# Patient Record
Sex: MALE | Race: WHITE | NOT HISPANIC OR LATINO | URBAN - NONMETROPOLITAN AREA
[De-identification: names, ages, dates, MRNs, and addresses within clinical notes are randomized per-mention and may not be internally consistent; named-entity substitution may affect disease eponyms.]

---

## 2024-01-17 ENCOUNTER — TELEPHONE (OUTPATIENT)
Dept: PRIMARY CARE | Facility: CLINIC | Age: 34
End: 2024-01-17

## 2024-01-17 DIAGNOSIS — H10.33 ACUTE CONJUNCTIVITIS OF BOTH EYES, UNSPECIFIED ACUTE CONJUNCTIVITIS TYPE: Primary | ICD-10-CM

## 2024-01-17 RX ORDER — TOBRAMYCIN 3 MG/ML
1 SOLUTION/ DROPS OPHTHALMIC
Qty: 5 ML | Refills: 0 | Status: SHIPPED | OUTPATIENT
Start: 2024-01-17 | End: 2024-01-24

## 2024-01-17 NOTE — TELEPHONE ENCOUNTER
Called tobramycin 0.3% ophthalmic solution  5 ML 0 refills.  Administer 1 drop into both eyes every 3 hours for 7 days into [facility].

## 2024-01-17 NOTE — TELEPHONE ENCOUNTER
Pt advises his two children are just getting over pink eye and now he believes he has it.  He is asking if you could call in med or if you would like to see him.

## 2024-12-22 ENCOUNTER — ANCILLARY PROCEDURE (OUTPATIENT)
Dept: URGENT CARE | Age: 34
End: 2024-12-22
Payer: COMMERCIAL

## 2024-12-22 ENCOUNTER — CLINICAL SUPPORT (OUTPATIENT)
Dept: URGENT CARE | Age: 34
End: 2024-12-22
Payer: COMMERCIAL

## 2024-12-22 VITALS
HEART RATE: 86 BPM | TEMPERATURE: 98.5 F | WEIGHT: 218.26 LBS | SYSTOLIC BLOOD PRESSURE: 145 MMHG | DIASTOLIC BLOOD PRESSURE: 94 MMHG | OXYGEN SATURATION: 98 % | RESPIRATION RATE: 16 BRPM

## 2024-12-22 DIAGNOSIS — M25.562 LEFT KNEE PAIN, UNSPECIFIED CHRONICITY: ICD-10-CM

## 2024-12-22 DIAGNOSIS — S86.912A KNEE STRAIN, LEFT, INITIAL ENCOUNTER: Primary | ICD-10-CM

## 2024-12-22 PROCEDURE — 73562 X-RAY EXAM OF KNEE 3: CPT | Mod: LEFT SIDE

## 2024-12-22 PROCEDURE — 99203 OFFICE O/P NEW LOW 30 MIN: CPT

## 2024-12-22 ASSESSMENT — ENCOUNTER SYMPTOMS
WEAKNESS: 0
COLOR CHANGE: 0
BACK PAIN: 0
FATIGUE: 0
ARTHRALGIAS: 1
JOINT SWELLING: 0
FEVER: 0
DIZZINESS: 0
SHORTNESS OF BREATH: 0
COUGH: 0
CHILLS: 0
WOUND: 0
NUMBNESS: 0

## 2024-12-22 NOTE — PROGRESS NOTES
Subjective   Patient ID: Nickolas Alvarez is a 34 y.o. male. They present today with a chief complaint of Injury (Left knee sore when walking started hurting yesterday worse pain today, hx of left knee pain ).    History of Present Illness  Patient is a 34-year-old male presenting to the clinic with complaints of left knee pain.  Patient states left knee pain has been present from yesterday to today.  Patient states he does workout daily and that he was doing legs on Wednesday.  Patient states one of the exercises that he was doing was leg extensions.  Patient states pain is not present at rest.  The pain is only present with movement and ambulation.  Patient still with full range of motion.  No numbness, tingling or weakness.  Patient states he is having difficulty walking secondary to pain.  Pain is exacerbated with ambulation over the left knee.  No other acute ROS.      History provided by:  Patient  Injury  Associated symptoms: no chest pain, no cough, no fatigue, no fever and no shortness of breath        Past Medical History  Allergies as of 12/22/2024    (No Known Allergies)       (Not in a hospital admission)       History reviewed. No pertinent past medical history.    History reviewed. No pertinent surgical history.     reports that he has never smoked. He has never used smokeless tobacco.    Review of Systems  Review of Systems   Constitutional:  Negative for chills, fatigue and fever.   Respiratory:  Negative for cough and shortness of breath.    Cardiovascular:  Negative for chest pain.   Musculoskeletal:  Positive for arthralgias. Negative for back pain and joint swelling.   Skin:  Negative for color change, pallor and wound.   Neurological:  Negative for dizziness, weakness and numbness.                                  Objective    Vitals:    12/22/24 1607   BP: (!) 156/98   BP Location: Left arm   Patient Position: Sitting   Pulse: 86   Resp: 16   Temp: 36.9 °C (98.5 °F)   TempSrc: Oral   SpO2: 98%    Weight: 99 kg (218 lb 4.1 oz)     No LMP for male patient.    Physical Exam  Vitals reviewed.   Constitutional:       General: He is not in acute distress.     Appearance: Normal appearance. He is normal weight. He is not ill-appearing or toxic-appearing.   HENT:      Head: Normocephalic and atraumatic.   Cardiovascular:      Rate and Rhythm: Normal rate and regular rhythm.      Pulses:           Dorsalis pedis pulses are 2+ on the left side.        Posterior tibial pulses are 2+ on the left side.      Heart sounds: Normal heart sounds. No murmur heard.     No friction rub. No gallop.   Pulmonary:      Effort: Pulmonary effort is normal. No respiratory distress.      Breath sounds: Normal breath sounds. No stridor. No wheezing, rhonchi or rales.   Musculoskeletal:         General: Tenderness present. No swelling. Normal range of motion.      Comments: Evaluation of left knee.  Left knee no signs of erythema, bruising or swelling.  Patient with full range of motion of the left knee.  No pain in absence of palpation.  Patient does elicit pain over the quadricep tendon as well as lateral joint line.  No pain outside of those 2 areas.  No ACL, PCL, MCL, LCL laxity.  Strength with flexion and extension intact.  Evaluation of left ankle and left foot no visible abnormalities.  Normal dorsiflexion and plantarflexion range of motion as well as strength.  Normal DP and PT pulses.   Skin:     General: Skin is warm.      Capillary Refill: Capillary refill takes less than 2 seconds.      Findings: No bruising or erythema.   Neurological:      General: No focal deficit present.      Mental Status: He is alert and oriented to person, place, and time. Mental status is at baseline.      Cranial Nerves: No cranial nerve deficit.      Sensory: No sensory deficit.      Motor: No weakness.   Psychiatric:         Mood and Affect: Mood normal.         Behavior: Behavior normal.         Procedures    Point of Care Test & Imaging Results  from this visit  No results found for this visit on 12/22/24.   No results found.    Diagnostic study results (if any) were reviewed by [facility].    Assessment/Plan   Allergies, medications, history, and pertinent labs/EKGs/Imaging reviewed by Carlito Pereyra PA-C.     Medical Decision Making:     Patient is a 34-year-old male presenting to the clinic with complaints of left knee pain.  Patient states left knee pain has been present from yesterday to today.  Patient states he does workout daily and that he was doing legs on Wednesday.  Patient states one of the exercises that he was doing was leg extensions.  Patient states pain is not present at rest.  The pain is only present with movement and ambulation.  Patient still with full range of motion.  No numbness, tingling or weakness.  Patient states he is having difficulty walking secondary to pain.  Pain is exacerbated with ambulation over the left knee.  No other acute ROS. Evaluation of left knee.  Left knee no signs of erythema, bruising or swelling.  Patient with full range of motion of the left knee.  No pain in absence of palpation.  Patient does elicit pain over the quadricep tendon as well as lateral joint line.  No pain outside of those 2 areas.  No ACL, PCL, MCL, LCL laxity.  Strength with flexion and extension intact.  Evaluation of left ankle and left foot no visible abnormalities.  Normal dorsiflexion and plantarflexion range of motion as well as strength.  Normal DP and PT pulses.  X-ray evaluation of left knee.  X-ray read by radiology no signs of acute fracture or dislocation.  Positive for joint effusion.  Potentially knee strain versus meniscal injury.  Patient with no weakness or laxity of the joint. Discharge instructions. Please follow up with orthopedics in the next 5 to 7 days. Likely strain injury versus potential meniscal injury.  There is joint effusion on x-ray.  Otherwise no other abnormalities.  Recommendation for rest, ice,  compression, elevation, ibuprofen as needed for pain control.  Would recommend limiting activity over the knee until following up with orthopedics for reevaluation. If you develop any worsening symptoms, intense uncontrollable pain, stiffness in the joint, fevers or chills or vomiting, numbness, tingling or weakness you must go the emergency room for evaluation. It is important to take prescriptions as prescribed and complete all antibiotics. If your symptoms worsen you are instructed to immediately go to the emergency room for reevaluation and further assessment. If you develop any chest pain, SOB, or difficulty breathing you are instructed to go to the emergency room for reevaluation. All discharge instructions will be provided and explained to the patient at discharge. If you have any questions regarding your treatment plan please call the [facility] urgent care clinic.     Orders and Diagnoses  There are no diagnoses linked to this encounter.    Medical Admin Record      Patient disposition: Home    Electronically signed by [facility]  4:14 PM

## 2024-12-22 NOTE — PATIENT INSTRUCTIONS
Discharge instructions    Please follow up with orthopedics in the next 5 to 7 days.    Likely overuse injury versus potential meniscal injury.  There is joint effusion on x-ray.  Otherwise no other abnormalities.  Recommendation for rest, ice, compression, elevation, ibuprofen as needed for pain control.  Would recommend limiting activity over the knee until following up with orthopedics for reevaluation.    If you develop any worsening symptoms, intense uncontrollable pain, stiffness in the joint, fevers or chills or vomiting, numbness, tingling or weakness you must go the emergency room for evaluation.    It is important to take prescriptions as prescribed and complete all antibiotics.     If your symptoms worsen you are instructed to immediately go to the emergency room for reevaluation and further assessment.    If you develop any chest pain, SOB, or difficulty breathing you are instructed to go to the emergency room for reevaluation.    All discharge instructions will be provided and explained to the patient at discharge.    If you have any questions regarding your treatment plan please call the [facility] urgent care clinic.

## 2025-01-02 ENCOUNTER — APPOINTMENT (OUTPATIENT)
Dept: ORTHOPEDIC SURGERY | Facility: CLINIC | Age: 35
End: 2025-01-02
Payer: COMMERCIAL

## 2025-01-24 ENCOUNTER — TELEMEDICINE (OUTPATIENT)
Dept: PRIMARY CARE | Facility: CLINIC | Age: 35
End: 2025-01-24
Payer: COMMERCIAL

## 2025-01-24 DIAGNOSIS — M10.9 ACUTE GOUT INVOLVING TOE OF RIGHT FOOT, UNSPECIFIED CAUSE: Primary | ICD-10-CM

## 2025-01-24 PROCEDURE — 99213 OFFICE O/P EST LOW 20 MIN: CPT | Performed by: NURSE PRACTITIONER

## 2025-01-24 RX ORDER — ALLOPURINOL 100 MG/1
200 TABLET ORAL DAILY
COMMUNITY
End: 2025-01-24 | Stop reason: SDUPTHER

## 2025-01-24 RX ORDER — ALLOPURINOL 100 MG/1
200 TABLET ORAL DAILY
Qty: 30 TABLET | Refills: 1 | Status: SHIPPED | OUTPATIENT
Start: 2025-01-24

## 2025-01-24 RX ORDER — PREDNISONE 20 MG/1
TABLET ORAL
Qty: 18 TABLET | Refills: 0 | Status: SHIPPED | OUTPATIENT
Start: 2025-01-24 | End: 2025-02-02

## 2025-01-24 ASSESSMENT — ENCOUNTER SYMPTOMS
JOINT SWELLING: 1
MYALGIAS: 1
DIZZINESS: 0
FEVER: 0
APPETITE CHANGE: 0
SHORTNESS OF BREATH: 0
ACTIVITY CHANGE: 0
VOMITING: 0
NAUSEA: 0
FATIGUE: 0
COLOR CHANGE: 1
LIGHT-HEADEDNESS: 0
HEADACHES: 0

## 2025-01-24 NOTE — PROGRESS NOTES
Subjective   Patient ID: Nickolas Alvarez is a 34 y.o. male who presents for Gout (Flare up).    Gout flare started about 2 weeks ago    Was previously on allopurinol and has not seen PCP for 3 years, he has appointment but it isnt until April, he has had gout flares but they  have been mild  Currently he is having pain, swelling, redness to 2nd toes right foot  He would like to go back on allopurinol as well          Review of Systems   Constitutional:  Negative for activity change, appetite change, fatigue and fever.   Respiratory:  Negative for shortness of breath.    Cardiovascular:  Negative for chest pain.   Gastrointestinal:  Negative for nausea and vomiting.   Musculoskeletal:  Positive for joint swelling and myalgias.   Skin:  Positive for color change.   Neurological:  Negative for dizziness, light-headedness and headaches.       Objective   There were no vitals taken for this visit.    Physical Exam  Constitutional:       General: He is not in acute distress.     Appearance: Normal appearance. He is not ill-appearing.      Comments: On Demand Virtual Visit Patient Consent     An interactive audio and video telecommunication system which permits real time communications between the patient (at the originating site) and provider (at the distant site) was utilized to provide this telehealth service.   Verbal consent was requested and obtained from Nickolas Alvarez (or parent if under 18) on this date, for a telehealth visit.   I have verbally confirmed with Nickolas Alvarez (or parent if under 18) that they are physically located in the [facility] during this virtual visit.    I performed this visit using realtime telehealth tools, including an audio/video OR telephone connection between the patient listed who was located in the [facility] and myself, Jon Cuadra CNP (licensed in the [facility]).  At the start of the visit, I introduced myself as Jon Cuadra, Nurse practitioner and verified the patients name,  , and current physical location.    If they were currently outside of the state of [place], the visit was ended and the patient was referred to alternative means for evaluation and treatment.   The patient was made aware of the limitations of the telehealth visit.  They will not be physically examined and all issues may not be appropriate for a telehealth visit.  If necessary, an in person referral will be made.       DISCLAIMER:   In preparing for this visit and writing this note, I reviewed previous electronic medical records (labs, imaging and medical charts) available.  Significant findings which helped in decision making are recorded in this encounter charting.     Pulmonary:      Effort: Pulmonary effort is normal.   Neurological:      Mental Status: He is alert and oriented to person, place, and time.         Assessment/Plan   Diagnoses and all orders for this visit:  Acute gout involving toe of right foot, unspecified cause  -     predniSONE (Deltasone) 20 mg tablet; Take 3 tablets (60 mg) by mouth once daily for 3 days, THEN 2 tablets (40 mg) once daily for 3 days, THEN 1 tablet (20 mg) once daily for 3 days.  -     allopurinol (Zyloprim) 100 mg tablet; Take 2 tablets (200 mg) by mouth once daily.     Follow up with PCP as needed  [facility]

## 2025-01-24 NOTE — PATIENT INSTRUCTIONS
Pleasure meeting with you today!    Let me know if you need anything.     Please send me a MyChart message if you have any questions or concerns.  FOR NON URGENT questions only.  Allow up to 72 hours for response.     If you have prescription issues or other questions you can email   Ramya Mcgowan,  Digital Health Coordinator, at   [email or web address]

## 2025-04-21 ENCOUNTER — OFFICE VISIT (OUTPATIENT)
Dept: PRIMARY CARE | Facility: CLINIC | Age: 35
End: 2025-04-21
Payer: COMMERCIAL

## 2025-04-21 VITALS
WEIGHT: 215.6 LBS | HEIGHT: 66 IN | HEART RATE: 80 BPM | SYSTOLIC BLOOD PRESSURE: 122 MMHG | DIASTOLIC BLOOD PRESSURE: 80 MMHG | BODY MASS INDEX: 34.65 KG/M2

## 2025-04-21 DIAGNOSIS — E66.3 OVERWEIGHT: ICD-10-CM

## 2025-04-21 DIAGNOSIS — M1A.0710 CHRONIC IDIOPATHIC GOUT INVOLVING TOE OF RIGHT FOOT WITHOUT TOPHUS: Primary | ICD-10-CM

## 2025-04-21 DIAGNOSIS — Z13.220 SCREENING, LIPID: ICD-10-CM

## 2025-04-21 PROCEDURE — 99203 OFFICE O/P NEW LOW 30 MIN: CPT | Performed by: FAMILY MEDICINE

## 2025-04-21 PROCEDURE — 1036F TOBACCO NON-USER: CPT | Performed by: FAMILY MEDICINE

## 2025-04-21 PROCEDURE — 3008F BODY MASS INDEX DOCD: CPT | Performed by: FAMILY MEDICINE

## 2025-04-21 RX ORDER — ALLOPURINOL 100 MG/1
200 TABLET ORAL DAILY
Qty: 180 TABLET | Refills: 1 | Status: SHIPPED | OUTPATIENT
Start: 2025-04-21

## 2025-04-21 RX ORDER — ALLOPURINOL 100 MG/1
200 TABLET ORAL DAILY
COMMUNITY
End: 2025-04-21 | Stop reason: SDUPTHER

## 2025-04-21 RX ORDER — INDOMETHACIN 25 MG/1
25 CAPSULE ORAL 3 TIMES DAILY PRN
Qty: 30 CAPSULE | Refills: 0 | Status: SHIPPED | OUTPATIENT
Start: 2025-04-21 | End: 2026-04-21

## 2025-04-21 ASSESSMENT — ENCOUNTER SYMPTOMS
NERVOUS/ANXIOUS: 0
PALPITATIONS: 0
CONSTIPATION: 0
HEADACHES: 0
COUGH: 0
SHORTNESS OF BREATH: 0
ACTIVITY CHANGE: 0
DIZZINESS: 0
BLOOD IN STOOL: 0
DYSPHORIC MOOD: 0
CHEST TIGHTNESS: 0

## 2025-04-21 ASSESSMENT — PAIN SCALES - GENERAL: PAINLEVEL_OUTOF10: 0-NO PAIN

## 2025-04-21 ASSESSMENT — PATIENT HEALTH QUESTIONNAIRE - PHQ9
1. LITTLE INTEREST OR PLEASURE IN DOING THINGS: NOT AT ALL
SUM OF ALL RESPONSES TO PHQ9 QUESTIONS 1 AND 2: 0
2. FEELING DOWN, DEPRESSED OR HOPELESS: NOT AT ALL

## 2025-04-21 NOTE — PROGRESS NOTES
"Subjective   Patient ID: RUFINO Alvarez is a 35 y.o. male who presents for [facility].    HPI   He presents today to \[facility]\"" again as a patient.  Overall he states he has been doing pretty well.  He works out regularly, 4-6 times a week.    That he is having his gout.  Last year he had 3 episodes of it.  The last was severe and lasted almost a week.  He subsequently got onto the allopurinol at 200 mg a day and that has kept him from having any other further exacerbations.    He states he was losing weight but got stuck at his current weight.  His wife wants make sure that he does not have hypothyroidism.  He states his energy is good.  Since has been working out his energy is much better than it has been.    Review of Systems   Constitutional:  Negative for activity change.   Respiratory:  Negative for cough, chest tightness and shortness of breath.    Cardiovascular:  Negative for chest pain, palpitations and leg swelling.   Gastrointestinal:  Negative for blood in stool and constipation.   Neurological:  Negative for dizziness and headaches.   Psychiatric/Behavioral:  Negative for dysphoric mood. The patient is not nervous/anxious.          Objective   /80   Pulse 80   Ht 1.676 m (5' 6\")   Wt 97.8 kg (215 lb 9.6 oz)   BMI 34.80 kg/m²     Physical Exam  Constitutional:       Appearance: Normal appearance.   Neck:      Thyroid: No thyromegaly or thyroid tenderness.      Vascular: No carotid bruit.   Cardiovascular:      Rate and Rhythm: Normal rate and regular rhythm.      Heart sounds: No murmur heard.  Pulmonary:      Effort: Pulmonary effort is normal.      Breath sounds: Normal breath sounds.   Musculoskeletal:      Cervical back: Neck supple.   Neurological:      Mental Status: He is alert.   Psychiatric:         Mood and Affect: Mood normal.         Assessment/Plan   Diagnoses and all orders for this visit:  Chronic idiopathic gout involving toe of right foot without tophus  -     allopurinol " (Zyloprim) 100 mg tablet; Take 2 tablets (200 mg) by mouth once daily.  -     indomethacin (Indocin) 25 mg capsule; Take 1 capsule (25 mg) by mouth 3 times a day as needed for mild pain (1 - 3) (pain).  Overweight  -     TSH with reflex to Free T4 if abnormal; Future  Screening, lipid  -     Comprehensive Metabolic Panel; Future  -     Lipid Panel; Future  Overall, doing well.  Will check the thyroid just to be sure that is okay.  We did discuss shifting his idea of his weight.  That is, sometimes we see ourselves in a certain way and that we can lose weight down to the new weight but when we let go it will tend to go back to the our weight that is in our consciousness.  If you shift the weight to the lower weight, saying that this is our normal weight and we are simply heavier, it should be easier to get back to the desired weight.   Will also check screening lipid panel and CMP.  I can see him back in 1 year to follow-up on the gout, or as needed.

## 2025-04-21 NOTE — PATIENT INSTRUCTIONS
Continue allopurinol daily.   Use the indomethacin as needed three times a day until the gout exacerbation resolves.     Check the blood test fasting.   Will check the thyroid test. Remember to put your mind where you want to go with weight.

## 2025-05-01 LAB
ALBUMIN SERPL-MCNC: 4.6 G/DL (ref 3.6–5.1)
ALP SERPL-CCNC: 76 U/L (ref 36–130)
ALT SERPL-CCNC: 27 U/L (ref 9–46)
ANION GAP SERPL CALCULATED.4IONS-SCNC: 9 MMOL/L (CALC) (ref 7–17)
AST SERPL-CCNC: 20 U/L (ref 10–40)
BILIRUB SERPL-MCNC: 0.6 MG/DL (ref 0.2–1.2)
BUN SERPL-MCNC: 12 MG/DL (ref 7–25)
CALCIUM SERPL-MCNC: 9.7 MG/DL (ref 8.6–10.3)
CHLORIDE SERPL-SCNC: 106 MMOL/L (ref 98–110)
CHOLEST SERPL-MCNC: 214 MG/DL
CHOLEST/HDLC SERPL: 3.6 (CALC)
CO2 SERPL-SCNC: 27 MMOL/L (ref 20–32)
CREAT SERPL-MCNC: 0.97 MG/DL (ref 0.6–1.26)
EGFRCR SERPLBLD CKD-EPI 2021: 103 ML/MIN/1.73M2
GLUCOSE SERPL-MCNC: 90 MG/DL (ref 65–99)
HDLC SERPL-MCNC: 59 MG/DL
LDLC SERPL CALC-MCNC: 129 MG/DL (CALC)
NONHDLC SERPL-MCNC: 155 MG/DL (CALC)
POTASSIUM SERPL-SCNC: 4.1 MMOL/L (ref 3.5–5.3)
PROT SERPL-MCNC: 7 G/DL (ref 6.1–8.1)
SODIUM SERPL-SCNC: 142 MMOL/L (ref 135–146)
TRIGL SERPL-MCNC: 138 MG/DL
TSH SERPL-ACNC: 0.89 MIU/L (ref 0.4–4.5)

## 2025-06-29 ENCOUNTER — CLINICAL SUPPORT (OUTPATIENT)
Dept: URGENT CARE | Age: 35
End: 2025-06-29
Payer: COMMERCIAL

## 2025-06-29 ENCOUNTER — ANCILLARY PROCEDURE (OUTPATIENT)
Dept: URGENT CARE | Age: 35
End: 2025-06-29
Payer: COMMERCIAL

## 2025-06-29 VITALS
HEART RATE: 97 BPM | DIASTOLIC BLOOD PRESSURE: 99 MMHG | SYSTOLIC BLOOD PRESSURE: 131 MMHG | BODY MASS INDEX: 34.55 KG/M2 | OXYGEN SATURATION: 100 % | HEIGHT: 66 IN | RESPIRATION RATE: 16 BRPM | TEMPERATURE: 98.2 F | WEIGHT: 215 LBS

## 2025-06-29 DIAGNOSIS — M10.9 GOUT OF LEFT FOOT, UNSPECIFIED CAUSE, UNSPECIFIED CHRONICITY: ICD-10-CM

## 2025-06-29 DIAGNOSIS — M10.9 GOUT OF LEFT FOOT, UNSPECIFIED CAUSE, UNSPECIFIED CHRONICITY: Primary | ICD-10-CM

## 2025-06-29 PROCEDURE — 99213 OFFICE O/P EST LOW 20 MIN: CPT

## 2025-06-29 PROCEDURE — 99070 SPECIAL SUPPLIES PHYS/QHP: CPT

## 2025-06-29 PROCEDURE — 3008F BODY MASS INDEX DOCD: CPT

## 2025-06-29 PROCEDURE — 73630 X-RAY EXAM OF FOOT: CPT | Mod: LEFT SIDE

## 2025-06-29 RX ORDER — PREDNISONE 20 MG/1
40 TABLET ORAL DAILY
Qty: 10 TABLET | Refills: 0 | Status: SHIPPED | OUTPATIENT
Start: 2025-06-29 | End: 2025-07-02 | Stop reason: DRUGHIGH

## 2025-06-29 ASSESSMENT — ENCOUNTER SYMPTOMS
ARTHRALGIAS: 1
FEVER: 0
BACK PAIN: 0
COLOR CHANGE: 0
WOUND: 0
CHILLS: 0
JOINT SWELLING: 1
COUGH: 0
FATIGUE: 0
SHORTNESS OF BREATH: 0
WEAKNESS: 0
NUMBNESS: 0
DIZZINESS: 0

## 2025-06-29 ASSESSMENT — PATIENT HEALTH QUESTIONNAIRE - PHQ9
SUM OF ALL RESPONSES TO PHQ9 QUESTIONS 1 & 2: 0
2. FEELING DOWN, DEPRESSED OR HOPELESS: NOT AT ALL
1. LITTLE INTEREST OR PLEASURE IN DOING THINGS: NOT AT ALL

## 2025-06-29 NOTE — PROGRESS NOTES
"Subjective   Patient ID: Nickolas Alvarez \"RUFINO\" is a 35 y.o. male. They present today with a chief complaint of Gout (Gout flare up. PT states he takes medication for the flare up, but the medications are not helping him. Started Tuesday.).    History of Present Illness  Patient is a 35-year-old male presenting to the clinic with complaints of gout.  Patient has a history of gout.  Patient is currently taking allopurinol and does use indomethacin as needed for pain.  Patient states gout usually affects the first MTP of the right foot or left foot.  Patient states for the last 5 to 7 days she has had pain over the left MTP.  He states he has been using the indomethacin and not noticing a substantial change in his pain.  Patient states for his severe gout episodes he typically needs a steroid.  He denies any history of diabetes.  He does have pain redness and swelling over the left first MTP.  No fevers no chills nausea or vomiting no systemic symptoms of infection at this time      History provided by:  Patient      Past Medical History  Allergies as of 06/29/2025    (No Known Allergies)       Prescriptions Prior to Admission[1]     Medical History[2]    Surgical History[3]     reports that he has never smoked. He has never used smokeless tobacco. He reports current alcohol use of about 3.0 standard drinks of alcohol per week. He reports that he does not use drugs.    Review of Systems  Review of Systems   Constitutional:  Negative for chills, fatigue and fever.   Respiratory:  Negative for cough and shortness of breath.    Cardiovascular:  Negative for chest pain.   Musculoskeletal:  Positive for arthralgias and joint swelling. Negative for back pain.   Skin:  Positive for rash. Negative for color change and wound.   Neurological:  Negative for dizziness, weakness and numbness.                                  Objective    Vitals:    06/29/25 1529 06/29/25 1614   BP: (!) 146/105 (!) 131/99   BP Location:  Right arm " "  Patient Position:  Sitting   BP Cuff Size:  Adult   Pulse: 97    Resp: 16    Temp: 36.8 °C (98.2 °F)    TempSrc: Oral    SpO2: 100%    Weight: 97.5 kg (215 lb)    Height: 1.676 m (5' 6\")      No LMP for male patient.    Physical Exam  Vitals reviewed.   Constitutional:       General: He is not in acute distress.     Appearance: Normal appearance. He is not ill-appearing or toxic-appearing.   HENT:      Head: Normocephalic and atraumatic.   Cardiovascular:      Rate and Rhythm: Normal rate and regular rhythm.      Pulses:           Dorsalis pedis pulses are 2+ on the left side.        Posterior tibial pulses are 2+ on the left side.      Heart sounds: Normal heart sounds. No murmur heard.     No friction rub. No gallop.   Pulmonary:      Effort: Pulmonary effort is normal. No respiratory distress.      Breath sounds: Normal breath sounds. No stridor. No wheezing, rhonchi or rales.   Musculoskeletal:      Left ankle: No swelling or ecchymosis. No tenderness. Normal range of motion.      Comments: ROM normal. Strength 5/5 with dorsi and plantar flexion.   Skin:     General: Skin is warm.      Capillary Refill: Capillary refill takes less than 2 seconds.      Findings: Erythema present. No bruising.      Comments: Patient does have some tenderness to palpation of the left MTP.  There is some associated redness and swelling over the first MTP.  There is no warmth.  Patient has full range of motion of the foot normal pulses normal sensation neurovascularly intact.   Neurological:      General: No focal deficit present.      Mental Status: He is alert and oriented to person, place, and time.      Motor: No weakness.         Procedures    Point of Care Test & Imaging Results from this visit  No results found for this visit on 06/29/25.   Imaging  XR foot left 3+ views  Result Date: 6/29/2025  No acute osseous finding. Medial forefoot soft tissue swelling.   MACRO: None.   Signed by: Melinda Tamez 6/29/2025 4:12 PM " Dictation workstation:   [id number]      Cardiology, Vascular, and Other Imaging  No other imaging results found for the past 2 days      Diagnostic study results (if any) were reviewed by [facility].    Assessment/Plan   Allergies, medications, history, and pertinent labs/EKGs/Imaging reviewed by Carlito Pereyra PA-C.     Medical Decision Making  Patient is a 35-year-old male presenting to the clinic with complaints of gout.  Patient has a history of gout.  Patient is currently taking allopurinol and does use indomethacin as needed for pain.  Patient states gout usually affects the first MTP of the right foot or left foot.  Patient states for the last 5 to 7 days she has had pain over the left MTP.  He states he has been using the indomethacin and not noticing a substantial change in his pain.  Patient states for his severe gout episodes he typically needs a steroid.  He denies any history of diabetes.  He does have pain redness and swelling over the left first MTP.  No fevers no chills nausea or vomiting no systemic symptoms of infection at this time.    Discharge instructions: Please follow up with your Primary Care Physician within the next 2-3 days. Patient with gout left MTP.  Patient states is the most common place for him to get gout he has a history of gout feels similar to gout. Patient educated to follow-up with primary care physician as above.  Patient to be treated with prednisone at this time.  Patient educated differentials include gout versus joint infection.  I do not believe this is a joint infection at this time.  It is similar to gout he has a history of gout it is likely gout. However, patient educated strict precautions if he develops any fevers chills sweats severe pain spread of redness spread of swelling spread of redness or swelling, severe stiffness in the joint or no improvement with therapy over the next 24 hours or worsening symptoms to go to the emergency room for further  evaluation and assessment. Given risk of GI bleeding I would not recommend taking your indomethacin with the prednisone. It is important to take prescriptions as prescribed and complete all antibiotics. If your symptoms worsen you are instructed to immediately go to the emergency room for reevaluation and further assessment. If you develop any chest pain, SOB, or difficulty breathing you are instructed to go to the emergency room for reevaluation. All discharge instructions will be provided and explained to the patient at discharge. If you have any questions regarding your treatment plan please call the [facility] urgent care clinic.     Orders and Diagnoses  Diagnoses and all orders for this visit:  Gout of left foot, unspecified cause, unspecified chronicity  -     XR foot left 3+ views; Future      Medical Admin Record      Patient disposition: Home    Electronically signed by [facility]  4:17 PM             [1] (Not in a hospital admission)  [2]   Past Medical History:  Diagnosis Date    Asthma 1998    Eczema 8/2005   [3]   Past Surgical History:  Procedure Laterality Date    VASECTOMY  2/2021

## 2025-06-29 NOTE — PATIENT INSTRUCTIONS
Discharge instructions:    Please follow up with your Primary Care Physician within the next 2-3 days.    Patient with gout left [facility].  Patient states is the most common place for him to get gout he has a history of gout feels similar to gout.    Patient educated to follow-up with primary care physician as above.  Patient to be treated with prednisone at this time.  Patient educated differentials include gout versus joint infection.  I do not believe this is a joint infection at this time.  It is similar to gout he has a history of gout it is likely gout.    However, patient educated strict precautions if he develops any fevers chills sweats severe pain spread of redness spread of swelling spread of redness or swelling, severe stiffness in the joint or no improvement with therapy over the next 24 hours or worsening symptoms to go to the emergency room for further evaluation and assessment.    Given risk of GI bleeding I would not recommend taking your indomethacin with the prednisone.    It is important to take prescriptions as prescribed and complete all antibiotics.     If your symptoms worsen you are instructed to immediately go to the emergency room for reevaluation and further assessment.    If you develop any chest pain, SOB, or difficulty breathing you are instructed to go to the emergency room for reevaluation.    All discharge instructions will be provided and explained to the patient at discharge.    If you have any questions regarding your treatment plan please call the [facility] urgent care clinic.

## 2025-06-30 ENCOUNTER — TELEPHONE (OUTPATIENT)
Dept: URGENT CARE | Age: 35
End: 2025-06-30

## 2025-07-02 ENCOUNTER — OFFICE VISIT (OUTPATIENT)
Dept: PRIMARY CARE | Facility: CLINIC | Age: 35
End: 2025-07-02
Payer: COMMERCIAL

## 2025-07-02 VITALS
SYSTOLIC BLOOD PRESSURE: 134 MMHG | WEIGHT: 219.2 LBS | BODY MASS INDEX: 35.38 KG/M2 | HEART RATE: 80 BPM | DIASTOLIC BLOOD PRESSURE: 86 MMHG

## 2025-07-02 DIAGNOSIS — M1A.0710 CHRONIC IDIOPATHIC GOUT INVOLVING TOE OF RIGHT FOOT WITHOUT TOPHUS: ICD-10-CM

## 2025-07-02 DIAGNOSIS — M10.072 ACUTE IDIOPATHIC GOUT INVOLVING TOE OF LEFT FOOT: Primary | ICD-10-CM

## 2025-07-02 PROBLEM — M10.9 GOUT: Status: ACTIVE | Noted: 2017-11-24

## 2025-07-02 PROCEDURE — 1036F TOBACCO NON-USER: CPT | Performed by: FAMILY MEDICINE

## 2025-07-02 PROCEDURE — 99213 OFFICE O/P EST LOW 20 MIN: CPT | Performed by: FAMILY MEDICINE

## 2025-07-02 RX ORDER — PREDNISONE 10 MG/1
TABLET ORAL
Qty: 27 TABLET | Refills: 0 | Status: SHIPPED | OUTPATIENT
Start: 2025-07-02

## 2025-07-02 RX ORDER — ALLOPURINOL 300 MG/1
300 TABLET ORAL DAILY
Qty: 30 TABLET | Refills: 5 | Status: SHIPPED | OUTPATIENT
Start: 2025-07-02

## 2025-07-02 ASSESSMENT — COLUMBIA-SUICIDE SEVERITY RATING SCALE - C-SSRS
6. HAVE YOU EVER DONE ANYTHING, STARTED TO DO ANYTHING, OR PREPARED TO DO ANYTHING TO END YOUR LIFE?: NO
1. IN THE PAST MONTH, HAVE YOU WISHED YOU WERE DEAD OR WISHED YOU COULD GO TO SLEEP AND NOT WAKE UP?: NO
2. HAVE YOU ACTUALLY HAD ANY THOUGHTS OF KILLING YOURSELF?: NO

## 2025-07-02 ASSESSMENT — PAIN SCALES - GENERAL: PAINLEVEL_OUTOF10: 8

## 2025-07-02 ASSESSMENT — PATIENT HEALTH QUESTIONNAIRE - PHQ9
1. LITTLE INTEREST OR PLEASURE IN DOING THINGS: NOT AT ALL
2. FEELING DOWN, DEPRESSED OR HOPELESS: NOT AT ALL
SUM OF ALL RESPONSES TO PHQ9 QUESTIONS 1 AND 2: 0

## 2025-07-02 NOTE — PROGRESS NOTES
Subjective   Patient ID: RUFINO Alvarez is a 35 y.o. male who presents for Gout Flare up X 8 days.    HPI   He presents today with a gout exacerbation.  When this originally started he took the indomethacin 3 times a day and did not get enough relief for that.  Went to the urgent care and was prescribed prednisone 40 mg for 5 days.  The first day he had relief but then the next 2 it seemed to get worse again.  Now it is worse still on the prednisone.  Currently 200 mg of the allopurinol.  Last exacerbation was in January.      Review of Systems    Objective   /86   Pulse 80   Wt 99.4 kg (219 lb 3.2 oz)   BMI 35.38 kg/m²     Physical Exam  He is alert, no apparent stress, his affect is pleasant.  Left great toe is tender to palpate.  Not much redness.  It is painful to plantar dorsiflex the toe.  He limps on that foot.    Assessment/Plan   Diagnoses and all orders for this visit:  Acute idiopathic gout involving toe of left foot  -     predniSONE (Deltasone) 10 mg tablet; 20 mg today, then 30 mg twice a day for 2 day, then 20 twice a day for two days, 10 mg twice a day for two days, the 10 mg daily for 2 day.  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  Will increase the prednisone dose to 60 mg.  He already took 40 today's we will take another 20 later on today and then do the taper as above.  He will let me know if this is not resolving the symptoms.  Will also increase to 300 mg of the allopurinol.

## 2025-07-02 NOTE — PATIENT INSTRUCTIONS
Take the prednisone taper as written. If things are clear after tomorrow, you can skip the extra day of 60 mg.     Increase to 300 mg of allopurinol.